# Patient Record
Sex: MALE | Race: WHITE | Employment: FULL TIME | URBAN - METROPOLITAN AREA
[De-identification: names, ages, dates, MRNs, and addresses within clinical notes are randomized per-mention and may not be internally consistent; named-entity substitution may affect disease eponyms.]

---

## 2018-02-07 ENCOUNTER — HOSPITAL ENCOUNTER (OUTPATIENT)
Dept: PHYSICAL THERAPY | Age: 58
Setting detail: THERAPIES SERIES
Discharge: HOME | End: 2018-02-07
Payer: COMMERCIAL

## 2018-02-07 PROCEDURE — 97161 PT EVAL LOW COMPLEX 20 MIN: CPT

## 2018-02-07 PROCEDURE — 97110 THERAPEUTIC EXERCISES: CPT

## 2018-02-07 ASSESSMENT — PAIN DESCRIPTION - ORIENTATION: ORIENTATION: RIGHT

## 2018-02-07 ASSESSMENT — PAIN SCALES - GENERAL: PAINLEVEL_OUTOF10: 2

## 2018-02-07 ASSESSMENT — PAIN DESCRIPTION - PAIN TYPE: TYPE: ACUTE PAIN

## 2018-02-07 ASSESSMENT — PAIN DESCRIPTION - LOCATION: LOCATION: LEG

## 2018-02-07 ASSESSMENT — PAIN DESCRIPTION - FREQUENCY: FREQUENCY: CONTINUOUS

## 2018-02-07 ASSESSMENT — PAIN DESCRIPTION - DESCRIPTORS: DESCRIPTORS: SHOOTING;TINGLING;NUMBNESS

## 2018-02-07 ASSESSMENT — PAIN DESCRIPTION - PROGRESSION: CLINICAL_PROGRESSION: GRADUALLY WORSENING

## 2018-02-07 NOTE — PROGRESS NOTES
[address]  PHYSICAL THERAPY EVALUATION    Date: 2018  Patient Name: Kiara Hagen       MRN: [id number]   Account: [de-identified]   : 1960  (65 y.o.)   Gender: male   Referring Practitioner: Kami Sainz                 Diagnosis: LBP, lumbar DDD, lumbar radiculopathy   Treatment Diagnosis: R LE pain/ foot N/T, decreased R ankle df and great toe ext arom, R ankle, and hip strength, mod talocrural and sybtalar hypomobility, (+) R trendelenberg, scour, and FADIR  Additional Pertinent Hx: xray at [address] showed arthritis in R hip, lumbar per pt         Past Medical History:  has no past medical history on file. Past Surgical History:   has no past surgical history on file.     Vital Signs  Patient Currently in Pain: Yes   Pain Screening  Patient Currently in Pain: Yes  Pain Assessment  Pain Assessment: 0-10  Pain Level: 2 (Pain ranges from 1-6/10 )  Pain Type: Acute pain  Pain Location: Leg  Pain Orientation: Right  Pain Radiating Towards: tingling on R anterior shin and N/T into R foot medial foot and great toe   Pain Descriptors: Shooting;Tingling;Numbness  Pain Frequency: Continuous  Clinical Progression: Gradually worsening  Effect of Pain on Daily Activities: Increased pain/difficulty with sleeping, walking/standing/wbing   Pain Intervention(s): Medication (see eMar)     ADL Assistance: Independent  Homemaking Assistance: Independent  Homemaking Responsibilities: Yes  Ambulation Assistance: Independent  Transfer Assistance: Independent  Active : Yes  Occupation: Full time employment  Type of occupation: office/business owner   Leisure & Hobbies: basketball, being active, birthday entertainment for children (balloon twisting)    IADL Comments: difficulty with balancing on R LE to dress  Additional Comments: current functional level 20%      Subjective:  Subjective: pt reports insidious onset of R ankle, foot, and shin pain that started 18, went to ER where they prescribed    Evaluation and patient rights have been reviewed and patient agrees with plan of care. Yes  [x]  No  []   Explain:     [place] Fall Risk Assessment  Risk Factor Scale  Score   History of Falls [] Yes  [x] No 25  0    Secondary Diagnosis [] Yes  [x] No 15  0    Ambulatory Aid [] Furniture  [] Crutches/cane/walker  [x] None/bedrest/wheelchair/nurse 30  15  0    IV/Heparin Lock [] Yes  [x] No 20  0    Gait/Transferring [] Impaired  [] Weak  [x] Normal/bedrest/immobile 20  10  0    Mental Status [] Forgets limitations  [x] Oriented to own ability 15  0       Total: 0     Based on the Assessment score: check the appropriate box. [x]  No intervention needed   Low =   Score of 0-24  []  Use standard prevention interventions Moderate =  Score of 24-44   [] Discuss fall prevention strategies   [] Indicate moderate falls risk on eval  []  Use high risk prevention interventions High = Score of 45 and higher   [] Discuss fall prevention strategies   [] Provide supervision during treatment time    Goals  Short term goals  Time Frame for Short term goals: 2-3 weeks   Short term goal 1: The pt will report decreased pain </=2/10 at worst in order to perform functional ADL's  Long term goals  Time Frame for Long term goals : 4-6 weeks   Long term goal 1: Pt will be indep/compliant with HEP in order to self manage symptoms upon D/C  Long term goal 2: The pt will have an increase in LEFS score >/=10 points demonstrating an improvement in functional activity tolerace  Long term goal 3: The pt will demo improved R LE strength >/= 4+/5 in order to safely ambulate with decreased pain/limitations   Long term goal 4: The pt will demo improved R SLS >/=15 seconds with (-) trendelenberg and minimal frontal plane instability to increase ease with ambulation   Long term goal 5:  The pt will demo improved R ankle df and great toe ext AROM >/=10* in order to increase ease with ADL's    Treatment Initiated : ther ex and hep     PT Individual Minutes  Time In: 1430  Time Out: 1520  Minutes: 50  Timed Code Treatment Minutes: 10 Minutes  Procedure Minutes: 40 minutes  Electronically signed by Gurmeet Orellana PT on 2/7/18 at 3:33 PM

## 2018-02-08 ASSESSMENT — PAIN DESCRIPTION - PROGRESSION: CLINICAL_PROGRESSION: GRADUALLY WORSENING

## 2018-02-08 NOTE — PROGRESS NOTES
[facility]       Physical Therapy  Cancellation/No-show Note  Patient Name:  Julene Cockayne  :  1960   Date:  2018  Referring Practitioner: Vaishnavi Ghosh  Diagnosis: LBP, lumbar DDD, lumbar radiculopathy     Visit Information:  PT Visit Information  PT Insurance Information: Medical [place]   Total # of Visits Approved: 40  Total # of Visits to Date: 1  No Show: 0  Canceled Appointment: 0  Progress Note Counter: ( cx 18)    For today's appointment patient:  [x]  Cancelled  []  Rescheduled appointment  []  No-show   []  Called pt to remind of next appointment     Reason given by patient:  []  Patient ill  [x]  Conflicting appointment  []  No transportation    []  Conflict with work  []  No reason given  []  Other:       Comments:       Signature: Electronically signed by Joseph Sharma PTA on 18 at 4:11 PM

## 2018-02-09 ENCOUNTER — HOSPITAL ENCOUNTER (OUTPATIENT)
Dept: PHYSICAL THERAPY | Age: 58
Setting detail: THERAPIES SERIES
Discharge: HOME | End: 2018-02-09
Payer: COMMERCIAL

## 2018-02-12 ENCOUNTER — HOSPITAL ENCOUNTER (OUTPATIENT)
Dept: PHYSICAL THERAPY | Age: 58
Setting detail: THERAPIES SERIES
Discharge: HOME | End: 2018-02-12
Payer: COMMERCIAL

## 2018-02-12 PROCEDURE — 97110 THERAPEUTIC EXERCISES: CPT

## 2018-02-12 ASSESSMENT — PAIN DESCRIPTION - PROGRESSION: CLINICAL_PROGRESSION: GRADUALLY WORSENING

## 2018-02-12 ASSESSMENT — PAIN DESCRIPTION - ORIENTATION: ORIENTATION: RIGHT

## 2018-02-12 ASSESSMENT — PAIN DESCRIPTION - PAIN TYPE: TYPE: ACUTE PAIN

## 2018-02-12 ASSESSMENT — PAIN DESCRIPTION - LOCATION: LOCATION: FOOT;LEG

## 2018-02-12 ASSESSMENT — PAIN SCALES - GENERAL: PAINLEVEL_OUTOF10: 2

## 2018-02-12 NOTE — PROGRESS NOTES
[address] [address]  Outpatient Physical Therapy    Treatment Note        Date: 2018  Patient: Kelvin Hartley  : 1960  ACCT #: [de-identified]  Referring Practitioner: Nishant Jarvis  Diagnosis: LBP, lumbar DDD, lumbar radiculopathy     Visit Information:  PT Visit Information  PT Insurance Information: Medical [place]   Total # of Visits Approved: 40  Total # of Visits to Date: 2  No Show: 0  Canceled Appointment: 0  Progress Note Counter: -    Subjective: Pt reports pain in R foot and into shin with some tingling and very sensitive to the touch. HEP Compliance:  [x] Good [] Fair [] Poor [] Reports not doing due to:    Vital Signs  Patient Currently in Pain: Yes   Pain Screening  Patient Currently in Pain: Yes  Pain Assessment  Pain Assessment: 0-10  Pain Level: 2  Pain Type: Acute pain  Pain Location: Foot;Leg  Pain Orientation: Right  Clinical Progression: Gradually worsening    OBJECTIVE:   Exercises  Exercise 1: gastroc stretch 30\"x3  Exercise 2: great toe ext stretch 30\"x3  Exercise 3: S/L hip abd x10  Exercise 4: bridges x10  Exercise 5: prone hip ext x10   Exercise 6: eccentric HR x10      Strength: [x] NT  [] MMT completed:     ROM: [x] NT  [] ROM measurements:     Manual:   Manual therapy  Soft Tissue Mobalization: R gastroc/soleus- 5 mins    Modalities: n/a        *Indicates exercise, modality, or manual techniques to be initiated when appropriate    Assessment: Body structures, Functions, Activity limitations: Decreased functional mobility , Decreased ROM, Decreased strength, Decreased balance  Assessment: Pt tolerated tx with questions about diagnosis and thinking it is  from his hip/LB and wants to work on both. Informed pt about exercises and stretches to treath his symptoms. Pain did not increase during or after tx.    Treatment Diagnosis: R LE pain/ foot N/T, decreased R ankle df and great toe ext arom, R ankle, and hip strength, mod talocrural and sybtalar

## 2018-02-16 ENCOUNTER — HOSPITAL ENCOUNTER (OUTPATIENT)
Dept: PHYSICAL THERAPY | Age: 58
Setting detail: THERAPIES SERIES
Discharge: HOME | End: 2018-02-16
Payer: COMMERCIAL

## 2018-02-16 PROCEDURE — 97110 THERAPEUTIC EXERCISES: CPT

## 2018-02-16 ASSESSMENT — PAIN DESCRIPTION - ORIENTATION: ORIENTATION: RIGHT

## 2018-02-16 ASSESSMENT — PAIN DESCRIPTION - FREQUENCY: FREQUENCY: CONTINUOUS

## 2018-02-16 ASSESSMENT — PAIN SCALES - GENERAL: PAINLEVEL_OUTOF10: 2

## 2018-02-16 ASSESSMENT — PAIN DESCRIPTION - PAIN TYPE: TYPE: ACUTE PAIN

## 2018-02-16 ASSESSMENT — PAIN DESCRIPTION - DESCRIPTORS: DESCRIPTORS: TIGHTNESS;TINGLING;NUMBNESS

## 2018-02-16 ASSESSMENT — PAIN DESCRIPTION - LOCATION: LOCATION: FOOT

## 2018-02-16 NOTE — PROGRESS NOTES
[address] [address]  Outpatient Physical Therapy    Treatment Note        Date: 2018  Patient: Anisha Lewis  : 1960  ACCT #: [de-identified]  Referring Practitioner: Nic Bledsoe  Diagnosis: LBP, lumbar DDD, lumbar radiculopathy     Visit Information:  PT Visit Information  PT Insurance Information: Medical [place]   Total # of Visits Approved: 40  Total # of Visits to Date: 3  No Show: 0  Canceled Appointment: 0  Progress Note Counter: 3/8-    Subjective:  Pt expresses concern that LE pain is coming from L4 and would like to address LB this visit. Pt not sleeping well due to discomfort. HEP Compliance:  [x] Good [] Fair [] Poor [] Reports not doing due to:    Vital Signs  Patient Currently in Pain: Yes   Pain Screening  Patient Currently in Pain: Yes  Pain Assessment  Pain Level: 2  Pain Type: Acute pain  Pain Location: Foot  Pain Orientation: Right  Pain Radiating Towards: 0/10 LBP  Pain Descriptors: Tightness;Tingling;Numbness  Pain Frequency: Continuous    OBJECTIVE:   Exercises  Exercise 1: gastroc stretch 30\"x3  Exercise 10: prone lying x 3 min  Exercise 11: CAYETANO x 3 min  Exercise 12: press ups 2 x10  Exercise 13: standing lumbar ext 2x10  Exercise 20: HEP: Body mechanics handout, Cherie protocol         Strength: [x] NT  [] MMT completed:          ROM: [] NT  [x] ROM measurements:        Spine  Lumbar: ext 30 degrees        Assessment:   Activity Tolerance  Activity Tolerance: Patient Tolerated treatment well    Body structures, Functions, Activity limitations: Decreased functional mobility , Decreased ROM, Decreased strength, Decreased balance  Assessment: Trialed Cherie protocol to determine origin of pain per [place], PT, DPT. Pt with decreased lumbar extension arom vs. evaluation this date and PT added goal to POC to further address. Slight reduction in R foot pain during CAYETANO and press ups although pain back to 2/10 after gastroc stretch.  Pt did have decreased tingling

## 2018-02-19 ENCOUNTER — HOSPITAL ENCOUNTER (OUTPATIENT)
Dept: PHYSICAL THERAPY | Age: 58
Setting detail: THERAPIES SERIES
Discharge: HOME | End: 2018-02-19
Payer: COMMERCIAL

## 2018-02-23 ENCOUNTER — HOSPITAL ENCOUNTER (OUTPATIENT)
Dept: PHYSICAL THERAPY | Age: 58
Setting detail: THERAPIES SERIES
Discharge: HOME | End: 2018-02-23
Payer: COMMERCIAL

## 2018-02-23 PROCEDURE — 97140 MANUAL THERAPY 1/> REGIONS: CPT

## 2018-02-23 PROCEDURE — 97110 THERAPEUTIC EXERCISES: CPT

## 2018-02-23 NOTE — PROGRESS NOTES
arom, R ankle, and hip strength, mod talocrural and sybtalar hypomobility, (+) R trendelenberg, scour, and FADIR  Prognosis: Good, Excellent       Goals:  Short term goals  Time Frame for Short term goals: 2-3 weeks   Short term goal 1: The pt will report decreased pain </=2/10 at worst in order to perform functional ADL's    Long term goals  Time Frame for Long term goals : 4-6 weeks   Long term goal 1: Pt will be indep/compliant with HEP in order to self manage symptoms upon D/C  Long term goal 2: The pt will have an increase in LEFS score >/=10 points demonstrating an improvement in functional activity tolerace  Long term goal 3: The pt will demo improved R LE strength >/= 4+/5 in order to safely ambulate with decreased pain/limitations   Long term goal 4: The pt will demo improved R SLS >/=15 seconds with (-) trendelenberg and minimal frontal plane instability to increase ease with ambulation   Long term goal 5: The pt will demo improved R ankle df and great toe ext AROM >/=10* in order to increase ease with ADL's  Long term goal 6: The pt will demo improved lumbar extension arom to WFL's in order to increase ease with functional ADL's   Progress toward goals: ongoing    POST-PAIN       Pain Rating (0-10 pain scale):   0/10   Location and pain description same as pre-treatment unless indicated. Action: [x] NA   [] Perform HEP  [] Meds as prescribed  [] Modalities as prescribed   [] Call Physician     Frequency/Duration:  Plan  Times per week: 2  Plan weeks: 4-6  Specific instructions for Next Treatment: Pt to see Kymberly Ford PT, DPT to reassess protocol  Current Treatment Recommendations: Strengthening, ROM, Balance Training, Neuromuscular Re-education, Home Exercise Program, Integrated Dry Needling, Manual Therapy - Soft Tissue Mobilization, Manual Therapy - Joint Manipulation, Gait Training, Modalities, Patient/Caregiver Education & Training     Pt to continue current HEP.   See objective section for any

## 2018-02-28 ENCOUNTER — HOSPITAL ENCOUNTER (OUTPATIENT)
Dept: PHYSICAL THERAPY | Age: 58
Setting detail: THERAPIES SERIES
Discharge: HOME | End: 2018-02-28
Payer: COMMERCIAL

## 2018-02-28 PROCEDURE — 97140 MANUAL THERAPY 1/> REGIONS: CPT

## 2018-02-28 PROCEDURE — 97110 THERAPEUTIC EXERCISES: CPT

## 2018-02-28 NOTE — PROGRESS NOTES
appropriate    Assessment: Body structures, Functions, Activity limitations: Decreased functional mobility , Decreased ROM, Decreased strength, Decreased balance  Assessment: Pt without directional preference and increased R foot radicular symptoms with extension and flexion based exercises. Trialed multiple manual techniques and positioning with variable symptom responses. Therefore completed exercise regimin in neutral spinal position to further core stability and decrease symptoms with fair tolerance. Treatment Diagnosis: R LE pain/ foot N/T, decreased R ankle df and great toe ext arom, R ankle, and hip strength, mod talocrural and sybtalar hypomobility, (+) R trendelenberg, scour, and FADIR  Prognosis: Good, Excellent  Patient Education: provided written update for hep     Goals:  Short term goals  Time Frame for Short term goals: 2-3 weeks   Short term goal 1: The pt will report decreased pain </=2/10 at worst in order to perform functional ADL's    Long term goals  Time Frame for Long term goals : 4-6 weeks   Long term goal 1: Pt will be indep/compliant with HEP in order to self manage symptoms upon D/C  Long term goal 2: The pt will have an increase in LEFS score >/=10 points demonstrating an improvement in functional activity tolerace  Long term goal 3: The pt will demo improved R LE strength >/= 4+/5 in order to safely ambulate with decreased pain/limitations   Long term goal 4: The pt will demo improved R SLS >/=15 seconds with (-) trendelenberg and minimal frontal plane instability to increase ease with ambulation   Long term goal 5:  The pt will demo improved R ankle df and great toe ext AROM >/=10* in order to increase ease with ADL's  Long term goal 6: The pt will demo improved lumbar extension arom to WFL's in order to increase ease with functional ADL's   Progress toward goals: fair     POST-PAIN       Pain Rating (0-10 pain scale):   same/10   Location and pain description same as pre-treatment

## 2018-03-02 ENCOUNTER — HOSPITAL ENCOUNTER (OUTPATIENT)
Dept: PHYSICAL THERAPY | Age: 58
Setting detail: THERAPIES SERIES
Discharge: HOME | End: 2018-03-02
Payer: COMMERCIAL

## 2018-03-02 PROCEDURE — 97110 THERAPEUTIC EXERCISES: CPT

## 2018-03-02 PROCEDURE — 97012 MECHANICAL TRACTION THERAPY: CPT

## 2018-03-02 ASSESSMENT — PAIN SCALES - GENERAL: PAINLEVEL_OUTOF10: 1

## 2018-03-02 ASSESSMENT — PAIN DESCRIPTION - LOCATION: LOCATION: FOOT

## 2018-03-02 ASSESSMENT — PAIN DESCRIPTION - DESCRIPTORS: DESCRIPTORS: SHOOTING

## 2018-03-02 ASSESSMENT — PAIN DESCRIPTION - ORIENTATION: ORIENTATION: RIGHT

## 2018-03-02 NOTE — PROGRESS NOTES
symptoms noted. Treatment Diagnosis: R LE pain/ foot N/T, decreased R ankle df and great toe ext arom, R ankle, and hip strength, mod talocrural and sybtalar hypomobility, (+) R trendelenberg, scour, and FADIR  Prognosis: Good, Excellent  Patient Education: postural and sleeping positions, continuing to monitor symptoms, what to avoid and centralization of symptoms, provided written handout for sleeping mechanics and posture, IDN     Goals:  Short term goals  Time Frame for Short term goals: 2-3 weeks   Short term goal 1: The pt will report decreased pain </=2/10 at worst in order to perform functional ADL's    Long term goals  Time Frame for Long term goals : 4-6 weeks   Long term goal 1: Pt will be indep/compliant with HEP in order to self manage symptoms upon D/C  Long term goal 2: The pt will have an increase in LEFS score >/=10 points demonstrating an improvement in functional activity tolerace  Long term goal 3: The pt will demo improved R LE strength >/= 4+/5 in order to safely ambulate with decreased pain/limitations   Long term goal 4: The pt will demo improved R SLS >/=15 seconds with (-) trendelenberg and minimal frontal plane instability to increase ease with ambulation   Long term goal 5: The pt will demo improved R ankle df and great toe ext AROM >/=10* in order to increase ease with ADL's  Long term goal 6: The pt will demo improved lumbar extension arom to WFL's in order to increase ease with functional ADL's   Progress toward goals: fair     POST-PAIN       Pain Rating (0-10 pain scale):   1/10   Location and pain description same as pre-treatment unless indicated.    Action: [x] NA   [] Perform HEP  [] Meds as prescribed  [] Modalities as prescribed   [] Call Physician     Frequency/Duration:  Plan  Times per week: 2  Plan weeks: 4-6  Specific instructions for Next Treatment: reassess tolerance to mechanical traction; Trial lateral hip mobs with hip flexion, cont traction if well tolerated;

## 2018-03-06 ENCOUNTER — HOSPITAL ENCOUNTER (OUTPATIENT)
Dept: PHYSICAL THERAPY | Age: 58
Setting detail: THERAPIES SERIES
Discharge: HOME | End: 2018-03-06
Payer: COMMERCIAL

## 2018-03-06 PROCEDURE — 97110 THERAPEUTIC EXERCISES: CPT

## 2018-03-06 PROCEDURE — 97012 MECHANICAL TRACTION THERAPY: CPT

## 2018-03-06 ASSESSMENT — PAIN DESCRIPTION - PAIN TYPE: TYPE: ACUTE PAIN

## 2018-03-06 ASSESSMENT — PAIN DESCRIPTION - ORIENTATION: ORIENTATION: RIGHT

## 2018-03-06 ASSESSMENT — PAIN DESCRIPTION - DESCRIPTORS: DESCRIPTORS: SHOOTING

## 2018-03-06 ASSESSMENT — PAIN SCALES - GENERAL: PAINLEVEL_OUTOF10: 2

## 2018-03-06 ASSESSMENT — PAIN DESCRIPTION - LOCATION: LOCATION: FOOT

## 2018-03-06 NOTE — PROGRESS NOTES
[address] [address]  Outpatient Physical Therapy    Treatment Note        Date: 3/6/2018  Patient: Shade Duggan  : 1960  ACCT #: [de-identified]  Referring Practitioner: Dejuan Arzate  Diagnosis: LBP, lumbar DDD, lumbar radiculopathy     Visit Information:  PT Visit Information  PT Insurance Information: Medical [place]   Total # of Visits Approved: 40  Total # of Visits to Date: 7  No Show: 0  Canceled Appointment: 1  Progress Note Counter: -    Subjective: Pt reports having contd shooting pains into foot at night before bed as well as numbness in foot with laying in bed,  however is sleeping better. Did not attempt towel roll for manitianing neutral spine with sleeping yet. Feels a little more movement and feels that hip loosened up with a little increased ease with walking. Decreased numbness into big toe. HEP Compliance:  [x] Good [] Fair [] Poor [] Reports not doing due to:    Vital Signs  Patient Currently in Pain: Yes   Pain Screening  Patient Currently in Pain: Yes  Pain Assessment  Pain Assessment: 0-10  Pain Level: 2  Pain Type: Acute pain  Pain Location: Foot  Pain Orientation: Right  Pain Descriptors: Shooting    OBJECTIVE:   Exercises  Exercise 16: H/L hip abd isometrics 5\"x10   Exercise 17: TA isometrics 5\"x15   Exercise 18: standing toe extension x15  Exercise 19: heel walking 25'x2   Exercise 20: HEP: TA isometrics, h/l hip abd, heel walking, toe ext    Strength: [x] NT  [] MMT completed: Ankle df 4-/5     ROM: [x] NT  [] ROM measurements:    Modalities:  Modalities  Lumbar traction: mechanical x40# x15 minutes static pull      *Indicates exercise, modality, or manual techniques to be initiated when appropriate    Assessment:    Body structures, Functions, Activity limitations: Decreased functional mobility , Decreased ROM, Decreased strength, Decreased balance  Assessment: Contd with lumbar mecanical traction this date d/t good response and decreased symptoms while on unit. Pt with contd difficulty performing ankle df in standing position however is able to perform in setaed and supine with increased mobility. Pt also demonstrating improving gait pattern with decreasing compensations. Treatment Diagnosis: R LE pain/ foot N/T, decreased R ankle df and great toe ext arom, R ankle, and hip strength, mod talocrural and sybtalar hypomobility, (+) R trendelenberg, scour, and FADIR  Prognosis: Good, Excellent  Patient Education: postural sleeping positions, hep     Goals:  Short term goals  Time Frame for Short term goals: 2-3 weeks   Short term goal 1: The pt will report decreased pain </=2/10 at worst in order to perform functional ADL's    Long term goals  Time Frame for Long term goals : 4-6 weeks   Long term goal 1: Pt will be indep/compliant with HEP in order to self manage symptoms upon D/C  Long term goal 2: The pt will have an increase in LEFS score >/=10 points demonstrating an improvement in functional activity tolerace  Long term goal 3: The pt will demo improved R LE strength >/= 4+/5 in order to safely ambulate with decreased pain/limitations   Long term goal 4: The pt will demo improved R SLS >/=15 seconds with (-) trendelenberg and minimal frontal plane instability to increase ease with ambulation   Long term goal 5: The pt will demo improved R ankle df and great toe ext AROM >/=10* in order to increase ease with ADL's  Long term goal 6: The pt will demo improved lumbar extension arom to WFL's in order to increase ease with functional ADL's   Progress toward goals: fair     POST-PAIN       Pain Rating (0-10 pain scale):  same /10   Location and pain description same as pre-treatment unless indicated.    Action: [x] NA   [] Perform HEP  [] Meds as prescribed  [] Modalities as prescribed   [] Call Physician     Frequency/Duration:  Plan  Times per week: 2  Plan weeks: 4-6  Specific instructions for Next Treatment: cont to assess toleerance to traction  Current Treatment Recommendations: Strengthening, ROM, Balance Training, Neuromuscular Re-education, Home Exercise Program, Integrated Dry Needling, Manual Therapy - Soft Tissue Mobilization, Manual Therapy - Joint Manipulation, Gait Training, Modalities, Patient/Caregiver Education & Training     Pt to continue current HEP. See objective section for any therapeutic exercise changes, additions or modifications this date.     PT Individual Minutes  Time In: 8216  Time Out: 1010  Minutes: 50  Timed Code Treatment Minutes: 33 Minutes  Procedure Minutes: 15    Signature:  Electronically signed by Kelsie Mccullough PT on 3/6/18 at 10:13 AM

## 2018-03-09 ENCOUNTER — HOSPITAL ENCOUNTER (OUTPATIENT)
Dept: PHYSICAL THERAPY | Age: 58
Setting detail: THERAPIES SERIES
Discharge: HOME | End: 2018-03-09
Payer: COMMERCIAL

## 2018-03-09 PROCEDURE — 97110 THERAPEUTIC EXERCISES: CPT

## 2018-03-09 PROCEDURE — 97140 MANUAL THERAPY 1/> REGIONS: CPT

## 2018-03-09 PROCEDURE — 97116 GAIT TRAINING THERAPY: CPT

## 2018-03-09 PROCEDURE — 97012 MECHANICAL TRACTION THERAPY: CPT

## 2018-03-09 ASSESSMENT — PAIN DESCRIPTION - PAIN TYPE: TYPE: ACUTE PAIN

## 2018-03-09 ASSESSMENT — PAIN SCALES - GENERAL: PAINLEVEL_OUTOF10: 1

## 2018-03-09 ASSESSMENT — PAIN DESCRIPTION - LOCATION: LOCATION: FOOT

## 2018-03-09 ASSESSMENT — PAIN DESCRIPTION - DESCRIPTORS: DESCRIPTORS: SHOOTING

## 2018-03-09 ASSESSMENT — PAIN DESCRIPTION - ORIENTATION: ORIENTATION: RIGHT

## 2018-03-09 NOTE — PROGRESS NOTES
[address] [address]  Outpatient Physical Therapy    Treatment Note        Date: 3/9/2018  Patient: Hebert Cevallos  : 1960  ACCT #: [de-identified]  Referring Practitioner: Ramesh Escalante  Diagnosis: LBP, lumbar DDD, lumbar radiculopathy     Visit Information:  PT Visit Information  PT Insurance Information: Medical [place]   Total # of Visits Approved: 40  Total # of Visits to Date: 8  No Show: 0  Canceled Appointment: 1  Progress Note Counter: -    Subjective: Pt reports cont shooting pain in R foot. Although has not tried the towel roll, reports sleeping has improved. HEP Compliance:  [x] Good [] Fair [] Poor [] Reports not doing due to:    Vital Signs  Patient Currently in Pain: Yes   Pain Screening  Patient Currently in Pain: Yes  Pain Assessment  Pain Level: 1  Pain Type: Acute pain  Pain Location: Foot (great toe)  Pain Orientation: Right  Pain Descriptors: Shooting    OBJECTIVE:   Exercises  Exercise 15: H/L hip add chuck 5s x 10  Exercise 16: H/L hip abd isometrics 5\"x10   Exercise 17: TA isometrics 5\"x15   Exercise 18: standing toe extension x15  Exercise 20: HEP: TA isometrics, h/l hip abd, heel walking, toe ext        Ambulation 1  Device: No Device  Assistance: Independent  Quality of Gait: pelvic rotation with quick unloading of R LE, overpronation, decreased toe off R>L,  and R LE antalgia   Distance: Within dept and in front of mirror with improved control of deviations. Decreased control immediate without visual cue. 8 min        Strength: [] NT  [x] MMT completed:  Strength RLE  Comment: R hip flex/abd 3+/5     ROM: [x] NT  [] ROM measurements:         Manual:   Manual therapy  Joint mobilization: R lateral/inferior hip mob decreased symptoms 75% of time ; no pain with sacral flex, any compression to R hip or distractio with ER increased pain consistently.    Manual traction:  R LE distraction with long leg pull 30s x 4 and lat distraction with gait belt 10s x 5 and gentle hip

## 2018-03-12 NOTE — PROGRESS NOTES
[address]       Physical Therapy  Cancellation/No-show Note  Patient Name:  Kathy Bruno  :  1960   Date:  3/12/2018  Referring Practitioner: Cristina Lopez  Diagnosis: LBP, lumbar DDD, lumbar radiculopathy     Visit Information:  PT Visit Information  PT Insurance Information: Medical [place]   Total # of Visits Approved: 40  Total # of Visits to Date: 8  No Show: 0  Canceled Appointment: 2  Progress Note Counter: -pt cx 3/13/18    For today's appointment patient:  [x]  Cancelled  []  Rescheduled appointment  []  No-show   []  Called pt to remind of next appointment     Reason given by patient:  []  Patient ill  []  Conflicting appointment  []  No transportation    []  Conflict with work  []  No reason given  []  Other:       Comments: would like to meet with MD and \"figure things out. \" Will place chart on hold until F/U with MD.        Signature: Electronically signed by Caio Hoskins PT on 3/12/18 at 11:46 AM

## 2018-03-13 ENCOUNTER — HOSPITAL ENCOUNTER (OUTPATIENT)
Dept: PHYSICAL THERAPY | Age: 58
Setting detail: THERAPIES SERIES
Discharge: HOME | End: 2018-03-13
Payer: COMMERCIAL

## 2018-03-16 ENCOUNTER — HOSPITAL ENCOUNTER (OUTPATIENT)
Dept: PHYSICAL THERAPY | Age: 58
Setting detail: THERAPIES SERIES
End: 2018-03-16
Payer: COMMERCIAL

## 2018-03-21 NOTE — PROGRESS NOTES
Abel [address]     Ph: [phone]  Fax: [phone]    [] Certification  [] Recertification []  Plan of Care  [] Progress Note [x] Discharge      To:  Referring Practitioner: Eddy Jimenez      From:  Mirta Lowery, PT, DPT  Patient: Almita Patel     : 1960  Diagnosis: LBP, lumbar DDD, lumbar radiculopathy      Date: 3/21/2018    Progress Report Period from:  2018  to 3/9/2018    Total # of Visits to Date: 8   No Show: 0    Canceled Appointment: 2     OBJECTIVE:   Short Term Goals - Time Frame for Short term goals: 2-3 weeks     Goals Current/Discharge status  Met   Short term goal 1: The pt will report decreased pain </=2/10 at worst in order to perform functional ADL's  1/10 [x] yes  [] no     Long Term Goals - Time Frame for Long term goals : 4-6 weeks   Goals Current/ Discharge status Met   Long term goal 1: Pt will be indep/compliant with HEP in order to self manage symptoms upon D/C indep/compliant [x] yes  [] no   Long term goal 2: The pt will have an increase in LEFS score >/=10 points demonstrating an improvement in functional activity tolerace NT secondary to unexpected D/C [] yes  [] no   Long term goal 3: The pt will demo improved R LE strength >/= 4+/5 in order to safely ambulate with decreased pain/limitations  NT secondary to unexpected D/C [] yes  [] no   Long term goal 4: The pt will demo improved R SLS >/=15 seconds with (-) trendelenberg and minimal frontal plane instability to increase ease with ambulation  NT secondary to unexpected D/C [] yes  [] no   Long term goal 5:  The pt will demo improved R ankle df and great toe ext AROM >/=10* in order to increase ease with ADL's NT secondary to unexpected D/C [] yes  [] no    Long term goal 6: The pt will demo improved lumbar extension arom to WFL's in order to increase ease with functional ADL's  NT secondary to unexpected D/C []

## 2018-04-17 PROBLEM — G83.10 PARESIS OF SINGLE LOWER EXTREMITY (HCC): Status: ACTIVE | Noted: 2018-04-17

## 2018-04-17 PROBLEM — M21.371 FOOT DROP, RIGHT: Status: ACTIVE | Noted: 2018-04-17

## 2018-06-06 ENCOUNTER — HOSPITAL ENCOUNTER (OUTPATIENT)
Dept: GENERAL RADIOLOGY | Age: 58
Discharge: HOME | End: 2018-06-08
Payer: COMMERCIAL

## 2018-06-06 VITALS
SYSTOLIC BLOOD PRESSURE: 133 MMHG | DIASTOLIC BLOOD PRESSURE: 76 MMHG | WEIGHT: 165 LBS | HEIGHT: 72 IN | OXYGEN SATURATION: 96 % | BODY MASS INDEX: 22.35 KG/M2 | RESPIRATION RATE: 20 BRPM | TEMPERATURE: 97.9 F | HEART RATE: 59 BPM

## 2018-06-06 DIAGNOSIS — G60.0 PERONEAL MUSCULAR ATROPHY: ICD-10-CM

## 2018-06-06 LAB
APPEARANCE CSF: CLEAR
CLOT EVALUATION CSF: NORMAL
COLOR CSF: COLORLESS
GLUCOSE, CSF: 65 MG/DL (ref 50–70)
NO DIFFERENTIAL CSF: NORMAL
PLATELET # BLD: 194 K/UL (ref 130–400)
PROTEIN CSF: 231 MG/DL (ref 15–45)
RBC CSF: 0 K/UL
TUBE NUMBER CSF: NORMAL
VOLUME CSF: 7 ML
WBC CSF: 1 K/UL (ref 0–8)

## 2018-06-06 PROCEDURE — 84157 ASSAY OF PROTEIN OTHER: CPT

## 2018-06-06 PROCEDURE — 89050 BODY FLUID CELL COUNT: CPT

## 2018-06-06 PROCEDURE — 86617 LYME DISEASE ANTIBODY: CPT

## 2018-06-06 PROCEDURE — 82945 GLUCOSE OTHER FLUID: CPT

## 2018-06-06 PROCEDURE — 85049 AUTOMATED PLATELET COUNT: CPT

## 2018-06-06 PROCEDURE — 2500000003 HC RX 250 WO HCPCS: Performed by: PSYCHIATRY & NEUROLOGY

## 2018-06-06 PROCEDURE — 87798 DETECT AGENT NOS DNA AMP: CPT

## 2018-06-06 PROCEDURE — 62270 DX LMBR SPI PNXR: CPT

## 2018-06-06 RX ORDER — LIDOCAINE HYDROCHLORIDE 20 MG/ML
20 INJECTION, SOLUTION INFILTRATION; PERINEURAL ONCE
Status: COMPLETED | OUTPATIENT
Start: 2018-06-06 | End: 2018-06-06

## 2018-06-06 RX ADMIN — LIDOCAINE HYDROCHLORIDE 9 ML: 20 INJECTION, SOLUTION INFILTRATION; PERINEURAL at 10:30

## 2018-06-06 ASSESSMENT — PAIN - FUNCTIONAL ASSESSMENT
PAIN_FUNCTIONAL_ASSESSMENT: 0-10
PAIN_FUNCTIONAL_ASSESSMENT: 0-10

## 2018-06-06 ASSESSMENT — PAIN SCALES - GENERAL: PAINLEVEL_OUTOF10: 5

## 2018-06-08 LAB
LYME WESTERN BLOT IGG CSF: NEGATIVE
LYME WESTERN BLOT IGM CSF: NEGATIVE

## 2018-06-10 LAB
WEST NILE VIRUS, PCR: NOT DETECTED
WNV SOURCE: NORMAL

## 2018-08-04 DIAGNOSIS — R77.9 ABNORMALITY OF PLASMA PROTEIN: ICD-10-CM

## 2018-08-04 RX ORDER — SODIUM CHLORIDE 9 MG/ML
INJECTION, SOLUTION INTRAVENOUS ONCE
Status: CANCELLED | OUTPATIENT
Start: 2018-08-06 | End: 2018-08-06

## 2018-08-06 ENCOUNTER — HOSPITAL ENCOUNTER (OUTPATIENT)
Dept: INFUSION THERAPY | Age: 58
Setting detail: INFUSION SERIES
Discharge: HOME | End: 2018-08-06
Payer: COMMERCIAL

## 2018-08-06 VITALS
SYSTOLIC BLOOD PRESSURE: 103 MMHG | RESPIRATION RATE: 18 BRPM | TEMPERATURE: 98.3 F | DIASTOLIC BLOOD PRESSURE: 66 MMHG | WEIGHT: 165 LBS | HEART RATE: 57 BPM | BODY MASS INDEX: 22.35 KG/M2 | HEIGHT: 72 IN

## 2018-08-06 DIAGNOSIS — R77.9 ABNORMALITY OF PLASMA PROTEIN: ICD-10-CM

## 2018-08-06 LAB
ANION GAP SERPL CALCULATED.3IONS-SCNC: 14 MEQ/L (ref 7–13)
BUN BLDV-MCNC: 17 MG/DL (ref 6–20)
CALCIUM SERPL-MCNC: 9.4 MG/DL (ref 8.6–10.2)
CHLORIDE BLD-SCNC: 105 MEQ/L (ref 98–107)
CO2: 25 MEQ/L (ref 22–29)
CREAT SERPL-MCNC: 0.63 MG/DL (ref 0.7–1.2)
GFR AFRICAN AMERICAN: >60
GFR NON-AFRICAN AMERICAN: >60
GLUCOSE BLD-MCNC: 129 MG/DL (ref 74–109)
POTASSIUM SERPL-SCNC: 4.5 MEQ/L (ref 3.5–5.1)
SODIUM BLD-SCNC: 144 MEQ/L (ref 132–144)

## 2018-08-06 PROCEDURE — 80048 BASIC METABOLIC PNL TOTAL CA: CPT

## 2018-08-06 PROCEDURE — 2580000003 HC RX 258

## 2018-08-06 RX ORDER — SODIUM CHLORIDE 9 MG/ML
INJECTION, SOLUTION INTRAVENOUS
Status: DISCONTINUED
Start: 2018-08-06 | End: 2018-08-06 | Stop reason: WASHOUT

## 2018-08-06 RX ORDER — SODIUM CHLORIDE 0.9 % (FLUSH) 0.9 %
10 SYRINGE (ML) INJECTION PRN
Status: DISCONTINUED | OUTPATIENT
Start: 2018-08-06 | End: 2018-08-07 | Stop reason: HOSPADM

## 2018-08-06 RX ORDER — SODIUM CHLORIDE 9 MG/ML
INJECTION, SOLUTION INTRAVENOUS
Status: COMPLETED
Start: 2018-08-06 | End: 2018-08-06

## 2018-08-06 RX ORDER — SODIUM CHLORIDE 9 MG/ML
INJECTION, SOLUTION INTRAVENOUS ONCE
Status: CANCELLED | OUTPATIENT
Start: 2018-08-07 | End: 2018-08-07

## 2018-08-06 RX ORDER — SODIUM CHLORIDE 9 MG/ML
INJECTION, SOLUTION INTRAVENOUS ONCE
Status: CANCELLED | OUTPATIENT
Start: 2018-08-06 | End: 2018-08-06

## 2018-08-06 RX ORDER — SODIUM CHLORIDE 9 MG/ML
INJECTION, SOLUTION INTRAVENOUS ONCE
Status: COMPLETED | OUTPATIENT
Start: 2018-08-06 | End: 2018-08-06

## 2018-08-06 RX ADMIN — SODIUM CHLORIDE: 9 INJECTION, SOLUTION INTRAVENOUS at 11:11

## 2018-08-06 RX ADMIN — Medication 10 ML: at 11:11

## 2018-08-21 ENCOUNTER — HOSPITAL ENCOUNTER (OUTPATIENT)
Dept: INFUSION THERAPY | Age: 58
Setting detail: INFUSION SERIES
Discharge: HOME | End: 2018-08-21
Payer: COMMERCIAL

## 2018-08-21 VITALS
DIASTOLIC BLOOD PRESSURE: 80 MMHG | TEMPERATURE: 97.4 F | SYSTOLIC BLOOD PRESSURE: 131 MMHG | RESPIRATION RATE: 18 BRPM | HEART RATE: 59 BPM

## 2018-08-21 DIAGNOSIS — R77.9 ABNORMALITY OF PLASMA PROTEIN: ICD-10-CM

## 2018-08-21 LAB
ANION GAP SERPL CALCULATED.3IONS-SCNC: 12 MEQ/L (ref 7–13)
BUN BLDV-MCNC: 12 MG/DL (ref 6–20)
CALCIUM SERPL-MCNC: 9 MG/DL (ref 8.6–10.2)
CHLORIDE BLD-SCNC: 104 MEQ/L (ref 98–107)
CO2: 26 MEQ/L (ref 22–29)
CREAT SERPL-MCNC: 0.52 MG/DL (ref 0.7–1.2)
GFR AFRICAN AMERICAN: >60
GFR NON-AFRICAN AMERICAN: >60
GLUCOSE BLD-MCNC: 90 MG/DL (ref 74–109)
POTASSIUM SERPL-SCNC: 4.4 MEQ/L (ref 3.5–5.1)
SODIUM BLD-SCNC: 142 MEQ/L (ref 132–144)

## 2018-08-21 PROCEDURE — 96367 TX/PROPH/DG ADDL SEQ IV INF: CPT

## 2018-08-21 PROCEDURE — 96365 THER/PROPH/DIAG IV INF INIT: CPT

## 2018-08-21 PROCEDURE — 80048 BASIC METABOLIC PNL TOTAL CA: CPT

## 2018-08-21 PROCEDURE — 96361 HYDRATE IV INFUSION ADD-ON: CPT

## 2018-08-21 PROCEDURE — 6360000002 HC RX W HCPCS: Performed by: PSYCHIATRY & NEUROLOGY

## 2018-08-21 PROCEDURE — 96366 THER/PROPH/DIAG IV INF ADDON: CPT

## 2018-08-21 PROCEDURE — 2580000003 HC RX 258

## 2018-08-21 RX ORDER — SODIUM CHLORIDE 9 MG/ML
INJECTION, SOLUTION INTRAVENOUS ONCE
Status: CANCELLED | OUTPATIENT
Start: 2018-08-21 | End: 2018-08-21

## 2018-08-21 RX ORDER — SODIUM CHLORIDE 9 MG/ML
INJECTION, SOLUTION INTRAVENOUS ONCE
Status: COMPLETED | OUTPATIENT
Start: 2018-08-21 | End: 2018-08-21

## 2018-08-21 RX ORDER — SODIUM CHLORIDE 9 MG/ML
INJECTION, SOLUTION INTRAVENOUS
Status: COMPLETED
Start: 2018-08-21 | End: 2018-08-21

## 2018-08-21 RX ADMIN — IMMUNE GLOBULIN INTRAVENOUS (HUMAN) 30 G: 5 INJECTION, SOLUTION INTRAVENOUS at 11:43

## 2018-08-21 RX ADMIN — SODIUM CHLORIDE: 9 INJECTION, SOLUTION INTRAVENOUS at 10:59

## 2018-08-21 NOTE — FLOWSHEET NOTE
Patient to the floor ambulatory for ivig infusion. Vital signs taken. No distress is noted. Call light within reach.

## 2018-08-22 ENCOUNTER — HOSPITAL ENCOUNTER (OUTPATIENT)
Dept: INFUSION THERAPY | Age: 58
Setting detail: INFUSION SERIES
Discharge: HOME | End: 2018-08-22
Payer: COMMERCIAL

## 2018-08-22 VITALS
SYSTOLIC BLOOD PRESSURE: 130 MMHG | TEMPERATURE: 98.1 F | DIASTOLIC BLOOD PRESSURE: 80 MMHG | HEART RATE: 55 BPM | RESPIRATION RATE: 18 BRPM

## 2018-08-22 DIAGNOSIS — R77.9 ABNORMALITY OF PLASMA PROTEIN: ICD-10-CM

## 2018-08-22 LAB
ANION GAP SERPL CALCULATED.3IONS-SCNC: 13 MEQ/L (ref 7–13)
BUN BLDV-MCNC: 13 MG/DL (ref 6–20)
CALCIUM SERPL-MCNC: 8.3 MG/DL (ref 8.6–10.2)
CHLORIDE BLD-SCNC: 103 MEQ/L (ref 98–107)
CO2: 24 MEQ/L (ref 22–29)
CREAT SERPL-MCNC: 0.56 MG/DL (ref 0.7–1.2)
GFR AFRICAN AMERICAN: >60
GFR NON-AFRICAN AMERICAN: >60
GLUCOSE BLD-MCNC: 95 MG/DL (ref 74–109)
POTASSIUM SERPL-SCNC: 4.2 MEQ/L (ref 3.5–5.1)
SODIUM BLD-SCNC: 140 MEQ/L (ref 132–144)

## 2018-08-22 PROCEDURE — 96365 THER/PROPH/DIAG IV INF INIT: CPT

## 2018-08-22 PROCEDURE — 96366 THER/PROPH/DIAG IV INF ADDON: CPT

## 2018-08-22 PROCEDURE — 2580000003 HC RX 258

## 2018-08-22 PROCEDURE — 6360000002 HC RX W HCPCS: Performed by: PSYCHIATRY & NEUROLOGY

## 2018-08-22 PROCEDURE — 96367 TX/PROPH/DG ADDL SEQ IV INF: CPT

## 2018-08-22 PROCEDURE — 80048 BASIC METABOLIC PNL TOTAL CA: CPT

## 2018-08-22 RX ORDER — SODIUM CHLORIDE 9 MG/ML
INJECTION, SOLUTION INTRAVENOUS ONCE
Status: CANCELLED | OUTPATIENT
Start: 2018-08-22 | End: 2018-08-22

## 2018-08-22 RX ORDER — SODIUM CHLORIDE 9 MG/ML
INJECTION, SOLUTION INTRAVENOUS ONCE
Status: COMPLETED | OUTPATIENT
Start: 2018-08-22 | End: 2018-08-22

## 2018-08-22 RX ORDER — SODIUM CHLORIDE 9 MG/ML
INJECTION, SOLUTION INTRAVENOUS
Status: COMPLETED
Start: 2018-08-22 | End: 2018-08-22

## 2018-08-22 RX ADMIN — SODIUM CHLORIDE 250 ML: 9 INJECTION, SOLUTION INTRAVENOUS at 10:35

## 2018-08-22 RX ADMIN — IMMUNE GLOBULIN (HUMAN) 40 G: 10 INJECTION INTRAVENOUS; SUBCUTANEOUS at 11:25

## 2018-08-22 NOTE — FLOWSHEET NOTE
Spoke with Dr. Patricio Hall regarding yesterday's dose of IVIG only being 30 grams. Stated that that was ok and continue with 40 grams iv x 3 more doses and to call him if his creatinine is > 1.8.

## 2018-08-22 NOTE — FLOWSHEET NOTE
Patient to the floor ambulatory for ivig. Vital signs taken. Denies any discomfort. Sitting in the chair. No distress is noted.

## 2018-08-23 ENCOUNTER — HOSPITAL ENCOUNTER (OUTPATIENT)
Dept: INFUSION THERAPY | Age: 58
Setting detail: INFUSION SERIES
Discharge: HOME | End: 2018-08-23
Payer: COMMERCIAL

## 2018-08-23 VITALS
DIASTOLIC BLOOD PRESSURE: 72 MMHG | HEART RATE: 60 BPM | SYSTOLIC BLOOD PRESSURE: 131 MMHG | TEMPERATURE: 97.5 F | RESPIRATION RATE: 18 BRPM

## 2018-08-23 DIAGNOSIS — R77.9 ABNORMALITY OF PLASMA PROTEIN: ICD-10-CM

## 2018-08-23 LAB
ANION GAP SERPL CALCULATED.3IONS-SCNC: 12 MEQ/L (ref 7–13)
BUN BLDV-MCNC: 14 MG/DL (ref 6–20)
CALCIUM SERPL-MCNC: 8.5 MG/DL (ref 8.6–10.2)
CHLORIDE BLD-SCNC: 101 MEQ/L (ref 98–107)
CO2: 24 MEQ/L (ref 22–29)
CREAT SERPL-MCNC: 0.62 MG/DL (ref 0.7–1.2)
GFR AFRICAN AMERICAN: >60
GFR NON-AFRICAN AMERICAN: >60
GLUCOSE BLD-MCNC: 101 MG/DL (ref 74–109)
POTASSIUM SERPL-SCNC: 4 MEQ/L (ref 3.5–5.1)
SODIUM BLD-SCNC: 137 MEQ/L (ref 132–144)

## 2018-08-23 PROCEDURE — 96365 THER/PROPH/DIAG IV INF INIT: CPT

## 2018-08-23 PROCEDURE — 96366 THER/PROPH/DIAG IV INF ADDON: CPT

## 2018-08-23 PROCEDURE — 96367 TX/PROPH/DG ADDL SEQ IV INF: CPT

## 2018-08-23 PROCEDURE — 6360000002 HC RX W HCPCS: Performed by: PSYCHIATRY & NEUROLOGY

## 2018-08-23 PROCEDURE — 80048 BASIC METABOLIC PNL TOTAL CA: CPT

## 2018-08-23 PROCEDURE — 2580000003 HC RX 258

## 2018-08-23 RX ORDER — SODIUM CHLORIDE 9 MG/ML
INJECTION, SOLUTION INTRAVENOUS
Status: COMPLETED
Start: 2018-08-23 | End: 2018-08-23

## 2018-08-23 RX ORDER — SODIUM CHLORIDE 9 MG/ML
INJECTION, SOLUTION INTRAVENOUS ONCE
Status: COMPLETED | OUTPATIENT
Start: 2018-08-23 | End: 2018-08-23

## 2018-08-23 RX ORDER — SODIUM CHLORIDE 9 MG/ML
INJECTION, SOLUTION INTRAVENOUS ONCE
Status: CANCELLED | OUTPATIENT
Start: 2018-08-23 | End: 2018-08-23

## 2018-08-23 RX ADMIN — SODIUM CHLORIDE: 9 INJECTION, SOLUTION INTRAVENOUS at 10:41

## 2018-08-23 RX ADMIN — IMMUNE GLOBULIN (HUMAN) 40 G: 10 INJECTION INTRAVENOUS; SUBCUTANEOUS at 11:14

## 2018-08-23 NOTE — FLOWSHEET NOTE
Patient to the floor ambulatory for ivig. Vital signs taken. Denies any discomfort. Call light within reach.

## 2018-08-24 ENCOUNTER — HOSPITAL ENCOUNTER (OUTPATIENT)
Dept: INFUSION THERAPY | Age: 58
Setting detail: INFUSION SERIES
Discharge: HOME | End: 2018-08-24
Payer: COMMERCIAL

## 2018-08-24 VITALS
DIASTOLIC BLOOD PRESSURE: 70 MMHG | SYSTOLIC BLOOD PRESSURE: 124 MMHG | RESPIRATION RATE: 18 BRPM | HEART RATE: 59 BPM | TEMPERATURE: 98.1 F

## 2018-08-24 DIAGNOSIS — R77.9 ABNORMALITY OF PLASMA PROTEIN: ICD-10-CM

## 2018-08-24 LAB
ANION GAP SERPL CALCULATED.3IONS-SCNC: 9 MEQ/L (ref 7–13)
BUN BLDV-MCNC: 10 MG/DL (ref 6–20)
CALCIUM SERPL-MCNC: 8.7 MG/DL (ref 8.6–10.2)
CHLORIDE BLD-SCNC: 103 MEQ/L (ref 98–107)
CO2: 23 MEQ/L (ref 22–29)
CREAT SERPL-MCNC: 0.58 MG/DL (ref 0.7–1.2)
GFR AFRICAN AMERICAN: >60
GFR NON-AFRICAN AMERICAN: >60
GLUCOSE BLD-MCNC: 116 MG/DL (ref 74–109)
POTASSIUM SERPL-SCNC: 4.2 MEQ/L (ref 3.5–5.1)
SODIUM BLD-SCNC: 135 MEQ/L (ref 132–144)

## 2018-08-24 PROCEDURE — 96365 THER/PROPH/DIAG IV INF INIT: CPT

## 2018-08-24 PROCEDURE — 96366 THER/PROPH/DIAG IV INF ADDON: CPT

## 2018-08-24 PROCEDURE — 2580000003 HC RX 258

## 2018-08-24 PROCEDURE — 6360000002 HC RX W HCPCS: Performed by: PSYCHIATRY & NEUROLOGY

## 2018-08-24 PROCEDURE — 80048 BASIC METABOLIC PNL TOTAL CA: CPT

## 2018-08-24 RX ORDER — SODIUM CHLORIDE 9 MG/ML
INJECTION, SOLUTION INTRAVENOUS
Status: COMPLETED
Start: 2018-08-24 | End: 2018-08-24

## 2018-08-24 RX ORDER — 0.9 % SODIUM CHLORIDE 0.9 %
200 INTRAVENOUS SOLUTION INTRAVENOUS ONCE
Status: COMPLETED | OUTPATIENT
Start: 2018-08-24 | End: 2018-08-24

## 2018-08-24 RX ADMIN — IMMUNE GLOBULIN (HUMAN) 40 G: 10 INJECTION INTRAVENOUS; SUBCUTANEOUS at 11:45

## 2018-08-24 RX ADMIN — Medication 200 ML: at 11:09

## 2018-08-24 RX ADMIN — SODIUM CHLORIDE 200 ML: 9 INJECTION, SOLUTION INTRAVENOUS at 11:09

## 2018-08-24 NOTE — FLOWSHEET NOTE
Infusion complete. Tolerated well. Vital signs are stable. Ambulated off of the unit All equipment used in the care for this patient has been cleaned.

## 2018-08-24 NOTE — FLOWSHEET NOTE
Patient to the floor ambulatory for ivig infusion. Vital signs taken. Denies any discomfort. Call light within reach.